# Patient Record
Sex: FEMALE | Race: WHITE | URBAN - METROPOLITAN AREA
[De-identification: names, ages, dates, MRNs, and addresses within clinical notes are randomized per-mention and may not be internally consistent; named-entity substitution may affect disease eponyms.]

---

## 2017-01-27 ENCOUNTER — HOSPITAL ENCOUNTER (EMERGENCY)
Facility: CLINIC | Age: 47
Discharge: HOME | End: 2017-01-27
Attending: PHYSICIAN ASSISTANT | Admitting: PHYSICIAN ASSISTANT
Payer: MEDICAID

## 2017-01-27 ENCOUNTER — APPOINTMENT (OUTPATIENT)
Dept: GENERAL RADIOLOGY | Facility: CLINIC | Age: 47
End: 2017-01-27
Attending: PHYSICIAN ASSISTANT
Payer: MEDICAID

## 2017-01-27 VITALS
SYSTOLIC BLOOD PRESSURE: 108 MMHG | WEIGHT: 140 LBS | TEMPERATURE: 98.5 F | OXYGEN SATURATION: 100 % | HEART RATE: 71 BPM | DIASTOLIC BLOOD PRESSURE: 76 MMHG | BODY MASS INDEX: 21.29 KG/M2 | RESPIRATION RATE: 16 BRPM

## 2017-01-27 DIAGNOSIS — R42 LIGHTHEADEDNESS: ICD-10-CM

## 2017-01-27 LAB
ANION GAP SERPL CALCULATED.3IONS-SCNC: 10 MMOL/L (ref 3–14)
BASOPHILS # BLD AUTO: 0 10E9/L (ref 0–0.2)
BASOPHILS NFR BLD AUTO: 0.6 %
BUN SERPL-MCNC: 11 MG/DL (ref 7–30)
CALCIUM SERPL-MCNC: 8.3 MG/DL (ref 8.5–10.1)
CHLORIDE SERPL-SCNC: 103 MMOL/L (ref 94–109)
CO2 SERPL-SCNC: 25 MMOL/L (ref 20–32)
CREAT SERPL-MCNC: 0.69 MG/DL (ref 0.52–1.04)
DIFFERENTIAL METHOD BLD: ABNORMAL
EOSINOPHIL # BLD AUTO: 0.2 10E9/L (ref 0–0.7)
EOSINOPHIL NFR BLD AUTO: 2.4 %
ERYTHROCYTE [DISTWIDTH] IN BLOOD BY AUTOMATED COUNT: 14.6 % (ref 10–15)
GFR SERPL CREATININE-BSD FRML MDRD: ABNORMAL ML/MIN/1.7M2
GLUCOSE SERPL-MCNC: 86 MG/DL (ref 70–99)
HCG SERPL QL: NEGATIVE
HCT VFR BLD AUTO: 32.9 % (ref 35–47)
HGB BLD-MCNC: 10.6 G/DL (ref 11.7–15.7)
IMM GRANULOCYTES # BLD: 0 10E9/L (ref 0–0.4)
IMM GRANULOCYTES NFR BLD: 0.2 %
INTERPRETATION ECG - MUSE: NORMAL
LYMPHOCYTES # BLD AUTO: 2.2 10E9/L (ref 0.8–5.3)
LYMPHOCYTES NFR BLD AUTO: 33.9 %
MCH RBC QN AUTO: 25.9 PG (ref 26.5–33)
MCHC RBC AUTO-ENTMCNC: 32.2 G/DL (ref 31.5–36.5)
MCV RBC AUTO: 80 FL (ref 78–100)
MONOCYTES # BLD AUTO: 0.5 10E9/L (ref 0–1.3)
MONOCYTES NFR BLD AUTO: 7.9 %
NEUTROPHILS # BLD AUTO: 3.6 10E9/L (ref 1.6–8.3)
NEUTROPHILS NFR BLD AUTO: 55 %
NRBC # BLD AUTO: 0 10*3/UL
NRBC BLD AUTO-RTO: 0 /100
PLATELET # BLD AUTO: 271 10E9/L (ref 150–450)
POTASSIUM SERPL-SCNC: 3.4 MMOL/L (ref 3.4–5.3)
RBC # BLD AUTO: 4.1 10E12/L (ref 3.8–5.2)
SODIUM SERPL-SCNC: 138 MMOL/L (ref 133–144)
TROPONIN I SERPL-MCNC: NORMAL UG/L (ref 0–0.04)
WBC # BLD AUTO: 6.6 10E9/L (ref 4–11)

## 2017-01-27 PROCEDURE — 84484 ASSAY OF TROPONIN QUANT: CPT | Performed by: PHYSICIAN ASSISTANT

## 2017-01-27 PROCEDURE — 99285 EMERGENCY DEPT VISIT HI MDM: CPT | Mod: 25

## 2017-01-27 PROCEDURE — 80048 BASIC METABOLIC PNL TOTAL CA: CPT | Performed by: PHYSICIAN ASSISTANT

## 2017-01-27 PROCEDURE — 93005 ELECTROCARDIOGRAM TRACING: CPT

## 2017-01-27 PROCEDURE — 85025 COMPLETE CBC W/AUTO DIFF WBC: CPT | Performed by: PHYSICIAN ASSISTANT

## 2017-01-27 PROCEDURE — 71020 XR CHEST 2 VW: CPT

## 2017-01-27 PROCEDURE — 84703 CHORIONIC GONADOTROPIN ASSAY: CPT | Performed by: PHYSICIAN ASSISTANT

## 2017-01-27 RX ORDER — SODIUM CHLORIDE 9 MG/ML
1000 INJECTION, SOLUTION INTRAVENOUS CONTINUOUS
Status: DISCONTINUED | OUTPATIENT
Start: 2017-01-27 | End: 2017-01-27 | Stop reason: HOSPADM

## 2017-01-27 RX ORDER — LIDOCAINE 40 MG/G
CREAM TOPICAL
Status: DISCONTINUED | OUTPATIENT
Start: 2017-01-27 | End: 2017-01-27 | Stop reason: HOSPADM

## 2017-01-27 ASSESSMENT — ENCOUNTER SYMPTOMS
SHORTNESS OF BREATH: 0
DIZZINESS: 0
APPETITE CHANGE: 0
VOMITING: 0
DIARRHEA: 0
CHILLS: 0
WEAKNESS: 0
FEVER: 0
COUGH: 1
ABDOMINAL PAIN: 0
NUMBNESS: 0
ACTIVITY CHANGE: 1
LIGHT-HEADEDNESS: 1
NAUSEA: 0

## 2017-01-27 NOTE — ED AVS SNAPSHOT
Emergency Department    [address] [place] [address]    Phone:  [phone]    Fax:  [phone]                                       Liza C Schwab   MRN: [id number]    Department:   Emergency Department   Date of Visit:  1/27/2017           After Visit Summary Signature Page     I have received my discharge instructions, and my questions have been answered. I have discussed any challenges I see with this plan with the nurse or doctor.    ..........................................................................................................................................  Patient/Patient Representative Signature      ..........................................................................................................................................  Patient Representative Print Name and Relationship to Patient    ..................................................               ................................................  Date                                            Time    ..........................................................................................................................................  Reviewed by Signature/Title    ...................................................              ..............................................  Date                                                            Time

## 2017-01-27 NOTE — DISCHARGE INSTRUCTIONS
"Drink plenty of fluids and get plenty of rest.    Follow-up with primary care Monday or Tuesday for a recheck.     Return to the ED with worsening lightheadedness or if you faint, worsening cough or chest discomfort, difficulty breathing or if you become worse in any way.         Near-Fainting: Uncertain Cause  Fainting (syncope) is a temporary loss of consciousness (\"passing out\"). This happens when blood flow to the brain is reduced. Near-fainting (\"near-syncope\") is like fainting, but you do not fully \"pass out.\" Instead, you feel like you are going to pass out, but do not actually lose consciousness.  Signs and symptoms  The following are symptoms of near-fainting:    Feeling lightheaded or like you are going to faint    Weak pulse    Nausea    Sweating    Blurred vision    Palpitations    Chest pain    Hard time breathing    Feeling cool and clammy  Causes  This happens when your blood pressure suddenly drops, and not enough blood flows to your brain.  Common minor causes include:    Sudden emotional stress such as fear, pain, panic, sight of blood    Straining or overexertion, such as straining while using the toilet, coughing, sneezing    Standing up too quickly, or standing up for too long a time    Pregnancy  More serious causes include:    Very slow, fast, or irregular heart rate, an arrhythmia    Dehydration    Blood loss    Medications, especially blood pressure or heart medicines, or your medicines have recently changed    Heart attack    Heart valve problems  Remember, even  minor  causes can become serious if you fall and injure yourself, or are driving. The exact cause of your episode is not certain. More tests may be needed. It is very important that you follow up with your doctor as advised.  Home care  The following guidelines will help you care for yourself at home:  1. Rest today. Resume your normal activities as soon as you are feeling back to normal.  2. If you become lightheaded or dizzy, lie " down right away or sit with your head between your knees.  3. Because we do not know the exact cause of your near fainting spell, another spell could occur without warning. Therefore, do not drive a car or use dangerous equipment. Do not take a bath alone (use a shower instead). Do not swim alone. You can resume these activities when your doctor says that you are no longer in danger of having a near fainting spell.  4. Stay well hydrated by drinking enough fluid each day.  Follow-up care  Follow up with your doctor as instructed.  Call 911  Call 911 if any of the following occur:    Another fainting spell occurs, and it is not explained by the common causes listed above    Chest, arm, neck, jaw, back or abdominal pain    Shortness of breath    Weakness, tingling, or numbness in one side of the face, one arm or leg    Slurred speech, confusion, trouble walking or seeing    Seizure    Blood in vomit or stools (black or red color)  When to seek medical care  Get prompt medical attention if any of the following occur:    Recent changes in your medications    Occasional mild lightheadedness, especially when standing up too quickly or straining    (In women) unexpected vaginal bleeding    7814-8817 [employer]. [address] [place] [address], [place] [address]. All rights reserved. This information is not intended as a substitute for professional medical care. Always follow your healthcare professional's instructions.

## 2017-01-27 NOTE — ED PROVIDER NOTES
"  History     Chief Complaint:  Lightheadedness     HPI   Liza C Schwab is a 46 year old female with history of anemia who presents with lightheadedness. The patient states that she has been sick for a couple weeks with flu like symptoms with post nasal drip and coughing to the point that \"her lungs feel raw\" and that her cough is worse at night. The patient notes that she felt lightheaded at work this morning. The patient then felt cold and clammy and tingling in both arms while driving here and pulled her car over and had her  bring her to the ED. The patient notes that she also felt heart burn and notes that she might have been given regular coffee instead of decaf and the tingling in her arms and hands may be secondary to that. The patient denies weakness, but feels more tired upon presentation and is concerned about her lightheadedness and the tingling in her hands that is radiating up her arms. The patient states that her hands are freezing and feels like they are about to go numb. The patient also reports that she had temporal pressure earlier today more prominent on the right than her left, as if she were going to get a headache, but this resolved and she never did develop head pain. The patient denies fevers, chills, dizziness, nausea, vomiting, diarrhea, decrease in appetite, visual disturbance, leg swelling, recent prolonged travel, pregnancy, abdominal pain or any other complications or concerns. The patient reports that she took one dose of Adderall this morning and that her last menstrual cycle was on 1/4/17.    Allergies:  Erythromycin     Medications:    Amphetamine-Dextroamphetamine (ADDERALL PO)  DiphenhydrAMINE HCl (BENADRYL PO)  Fluticasone (FLONASE) 50 MCG/ACT nasal spray    Past Medical History:    ADD  High cholesterol    Past Surgical History:    History reviewed. No pertinent past surgical history.    Family History:    Paternal grandfather: .myocardial infarction    Social " History:  Marital Status:   Presents to the ED alone  [facility]: [facility]     Review of Systems   Constitutional: Positive for activity change (more tired). Negative for fever, chills and appetite change.   HENT: Positive for postnasal drip. Negative for congestion.    Eyes: Negative for visual disturbance.   Respiratory: Positive for cough. Negative for shortness of breath.    Cardiovascular: Negative for chest pain and leg swelling.   Gastrointestinal: Negative for nausea, vomiting, abdominal pain and diarrhea.   Neurological: Positive for light-headedness. Negative for dizziness, syncope, weakness and numbness.        Positive for bilateral arm tingling.    All other systems reviewed and are negative.      Physical Exam   First Vitals:  BP: 131/72 mmHg  Pulse: 71  Heart Rate: 71  Temp: 98.5  F (36.9  C)  Resp: 16  Weight: 63.504 kg (140 lb)  SpO2: 100 %      Physical Exam  General: Resting comfortably on the gurney.    Head:  The scalp, head and face appear normal. No evidence of trauma.   ENT:  Pupils are equal, round and reactive to light. EOM intact. No nystagmus.    Oropharynx is moist.  No uvular deviation. Posterior oropharynx is without erythema, exudate or tonsillar swelling.   Neck:  Supple, no rigidity noted. Normal ROM.     Trachea midline. No mass detected.    Lymph: No anterior or posterior cervical lymphadenopathy noted.   Resp:  Non-labored breathing. No tachypnea.     Lung fields clear to auscultation without wheezes or rales.   CV:  Regular rate and rhythm. Normal S1 and S2, no S3 or S4.     No pathological murmur detected.     Radial and PT pulses intact and symmetric.   GI:  Abdomen is soft and non-distended.     Non-tender to palpation in all four quadrants.    MS:  Normal muscular tone.     No asymmetrical lower leg swelling or calf tenderness.   Neuro: GCS 15.     Awake and alert. Speech is clear.     Sensation intact to light touch upper and lower extremities.      Symmetric facial expressions, normal coordination.   Skin:  No rash or pallor.  Psych:  Normal affect. Appropriate interactions.       Emergency Department Course   ECG:  @ 1123  Indication: lightheadedness  Vent. Rate 65 bpm. WY interval 142 ms. QRS duration 84 ms. QT/QTc 424/440 ms. P-R-T axis 60 75 70.   Normal sinus rhythm. Normal ECG.  No significant change when compared to previous ECG from 9/6/15   Read @ 1126 by Catalina Jensen PA-C.      Imaging:  Radiographic findings were communicated with the patient who voiced understanding of the findings.      XR Chest 2 Views   Final Result   IMPRESSION:  Negative.       KASSY TRIMBLE MD          All Readings Per Radiology Unless Otherwise Noted.    Laboratory:  CBC: WBC 6.6, HGB 10.6 (L),   BMP: Ca 8.3 (L), Rest WNL (Creatinine 0.69)  (1115) Troponin I: <0.015  HCG Qualitative Pregnancy (1115): Negative.    ED Course:  Nursing notes and past medical history reviewed.   I performed a physical examination of the patient as documented above.  I explained the plan with the patient who consents to this.   EKG was done, interpretation as above.  The patient was sent for a chest x-ray while in the emergency department, findings above.   Blood was drawn from the patient. This was sent for laboratory testing, findings above.   (1241) Rechecked the patient.   Findings and plan explained to the patient. Patient discharged home with instructions regarding supportive care, medications, and reasons to return. The importance of close follow-up was reviewed.     Impression & Plan    Medical Decision Making:  Liza C Schwab is a 46 year old female with history of high cholesterol and ADD who presents ED with lightheadedness. She has also had a cough for several weeks. Concern was for cardiac event or arrhythmia, PE, pneumonia, anemia, among others. She is low risk by Well's Criteria and PERC negative, so I do not feel that she requires d-dimer or chest CT at this time as  PE is unlikely. Her ECG is normal with no signs of acute ischemic event or arrhythmia. A troponin is negative making, acute cardiac event less likely. She is also not having any significant chest pain, just a tightness when she coughs. She is not pregnant. Lab work is otherwise unremarkable with no signs of a leukocytosis, significant electrolyte abnormality, or renal dysfunction. She does have a slightly low hemoglobin at 10.6. However, she has a history of this chronically and she reports that 10.6 is actually quite good for her. Chest x-ray is negative with no signs of infiltrate, cardiomegaly, widened mediastinum, or any other acute abnormalities. No real chest or back pain, no widened mediastinum, and symmetric normal pulses, I doubt dissection in this patient. At this time it is not clear the exact cause of her light headedness. However, it has improved while here in the ED and her tingling has completely resolved. She reports that she has had this tingling in the past and had it worked up at one point and no distinct etiology was found. She reports that she thinks that she thinks she may have accidentally drank caffeine today for the first time in 2 years. We discussed that this definitely could be causing her symptoms of lightheadedness and to stay away from further caffeine at this time. This also might be due to her recovering from her recent illness. No signs of pneumonia on CXR and I suspect this is viral in nature as she has been gradually improving. Otherwise, the remainder of her exam is normal here and I do not feel that she requires further work up in the ED. Her vital signs are normal and at this time I feel that she is safe for discharge to home. She is to follow up with primary care in 3-4 days if not improving. She will return to the ED with worsening lightheadedness, syncope, chest pain or difficulty breathing, vomiting, fevers, or if she becomes worse in any way.    Diagnosis:    ICD-10-CM     1. Lightheadedness R42        Disposition:   Discharge to home.      I, Jo Ann Servin, am serving as a scribe on 1/27/2017 at 11:01 AM to personally document services performed by Catalina Jensen PA-C, based on my observations and the provider's statements to me.      Catalina Jensen PA-C  01/27/17 1700

## 2017-01-27 NOTE — ED NOTES
Bed: ED20  Expected date: 1/27/17  Expected time: 10:53 AM  Means of arrival:   Comments:  Triage

## 2017-01-27 NOTE — ED AVS SNAPSHOT
"  Emergency Department    [address] [facility] [address]    Phone:  [phone]    Fax:  [phone]                                       Liza C Schwab   MRN: [id number]    Department:   Emergency Department   Date of Visit:  1/27/2017           Patient Information     Date Of Birth          1970        Your diagnoses for this visit were:     Lightheadedness        You were seen by Catalina Jensen PA-C.      Follow-up Information     Follow up with [facility] In 3 days.    Contact information:    [address]  [address]  [facility] [address] [phone]          Follow up with  Emergency Department.    Specialty:  EMERGENCY MEDICINE    Why:  If symptoms worsen    Contact information:    [address] [facility] [address] [phone]        Discharge Instructions       Drink plenty of fluids and get plenty of rest.    Follow-up with primary care Monday or Tuesday for a recheck.     Return to the ED with worsening lightheadedness or if you faint, worsening cough or chest discomfort, difficulty breathing or if you become worse in any way.         Near-Fainting: Uncertain Cause  Fainting (syncope) is a temporary loss of consciousness (\"passing out\"). This happens when blood flow to the brain is reduced. Near-fainting (\"near-syncope\") is like fainting, but you do not fully \"pass out.\" Instead, you feel like you are going to pass out, but do not actually lose consciousness.  Signs and symptoms  The following are symptoms of near-fainting:    Feeling lightheaded or like you are going to faint    Weak pulse    Nausea    Sweating    Blurred vision    Palpitations    Chest pain    Hard time breathing    Feeling cool and clammy  Causes  This happens when your blood pressure suddenly drops, and not enough blood flows to your brain.  Common minor causes include:    Sudden emotional stress such as fear, pain, panic, sight of blood    Straining or " overexertion, such as straining while using the toilet, coughing, sneezing    Standing up too quickly, or standing up for too long a time    Pregnancy  More serious causes include:    Very slow, fast, or irregular heart rate, an arrhythmia    Dehydration    Blood loss    Medications, especially blood pressure or heart medicines, or your medicines have recently changed    Heart attack    Heart valve problems  Remember, even  minor  causes can become serious if you fall and injure yourself, or are driving. The exact cause of your episode is not certain. More tests may be needed. It is very important that you follow up with your doctor as advised.  Home care  The following guidelines will help you care for yourself at home:  1. Rest today. Resume your normal activities as soon as you are feeling back to normal.  2. If you become lightheaded or dizzy, lie down right away or sit with your head between your knees.  3. Because we do not know the exact cause of your near fainting spell, another spell could occur without warning. Therefore, do not drive a car or use dangerous equipment. Do not take a bath alone (use a shower instead). Do not swim alone. You can resume these activities when your doctor says that you are no longer in danger of having a near fainting spell.  4. Stay well hydrated by drinking enough fluid each day.  Follow-up care  Follow up with your doctor as instructed.  Call 911  Call 911 if any of the following occur:    Another fainting spell occurs, and it is not explained by the common causes listed above    Chest, arm, neck, jaw, back or abdominal pain    Shortness of breath    Weakness, tingling, or numbness in one side of the face, one arm or leg    Slurred speech, confusion, trouble walking or seeing    Seizure    Blood in vomit or stools (black or red color)  When to seek medical care  Get prompt medical attention if any of the following occur:    Recent changes in your medications    Occasional  mild lightheadedness, especially when standing up too quickly or straining    (In women) unexpected vaginal bleeding    1857-1214 [employer]. [address] [place] [address], [place] [address]. All rights reserved. This information is not intended as a substitute for professional medical care. Always follow your healthcare professional's instructions.                24 Hour Appointment Hotline       To make an appointment at any [facility], call [phone] ([phone]). If you don't have a family doctor or clinic, we will help you find one. [facility] are conveniently located to serve the needs of you and your family.             Review of your medicines      Our records show that you are taking the medicines listed below. If these are incorrect, please call your family doctor or clinic.        Dose / Directions Last dose taken    ADDERALL PO        Take by mouth 2 times daily   Refills:  0        BENADRYL PO   Dose:  25 mg        Take 25 mg by mouth daily as needed   Refills:  0        fluticasone 50 MCG/ACT spray   Commonly known as:  FLONASE   Dose:  2 spray        Spray 2 sprays into both nostrils daily   Refills:  0                Procedures and tests performed during your visit     Basic metabolic panel    CBC with platelets differential    EKG 12-lead, tracing only    HCG qualitative    Troponin I    XR Chest 2 Views      Orders Needing Specimen Collection     None      Pending Results     No orders found from 1/26/2017 to 1/28/2017.            Pending Culture Results     No orders found from 1/26/2017 to 1/28/2017.       Test Results from your hospital stay           1/27/2017 11:34 AM - Interface, [employer] Results      Component Results     Component Value Ref Range & Units Status    WBC 6.6 4.0 - 11.0 10e9/L Final    RBC Count 4.10 3.8 - 5.2 10e12/L Final    Hemoglobin 10.6 (L) 11.7 - 15.7 g/dL Final    Hematocrit 32.9 (L) 35.0 - 47.0 % Final    MCV 80 78 - 100 fl Final    MCH 25.9  (L) 26.5 - 33.0 pg Final    MCHC 32.2 31.5 - 36.5 g/dL Final    RDW 14.6 10.0 - 15.0 % Final    Platelet Count 271 150 - 450 10e9/L Final    Diff Method Automated Method  Final    % Neutrophils 55.0 % Final    % Lymphocytes 33.9 % Final    % Monocytes 7.9 % Final    % Eosinophils 2.4 % Final    % Basophils 0.6 % Final    % Immature Granulocytes 0.2 % Final    Nucleated RBCs 0 0 /100 Final    Absolute Neutrophil 3.6 1.6 - 8.3 10e9/L Final    Absolute Lymphocytes 2.2 0.8 - 5.3 10e9/L Final    Absolute Monocytes 0.5 0.0 - 1.3 10e9/L Final    Absolute Eosinophils 0.2 0.0 - 0.7 10e9/L Final    Absolute Basophils 0.0 0.0 - 0.2 10e9/L Final    Abs Immature Granulocytes 0.0 0 - 0.4 10e9/L Final    Absolute Nucleated RBC 0.0  Final         1/27/2017 11:54 AM - Interface, [employer] Results      Component Results     Component Value Ref Range & Units Status    Sodium 138 133 - 144 mmol/L Final    Potassium 3.4 3.4 - 5.3 mmol/L Final    Chloride 103 94 - 109 mmol/L Final    Carbon Dioxide 25 20 - 32 mmol/L Final    Anion Gap 10 3 - 14 mmol/L Final    Glucose 86 70 - 99 mg/dL Final    Urea Nitrogen 11 7 - 30 mg/dL Final    Creatinine 0.69 0.52 - 1.04 mg/dL Final    GFR Estimate >90  Non  GFR Calc   >60 mL/min/1.7m2 Final    GFR Estimate If Black >90   GFR Calc   >60 mL/min/1.7m2 Final    Calcium 8.3 (L) 8.5 - 10.1 mg/dL Final         1/27/2017 11:57 AM - Interface, [employer] Results      Component Results     Component Value Ref Range & Units Status    Troponin I ES  0.000 - 0.045 ug/L Final    <0.015  The 99th percentile for upper reference range is 0.045 ug/L.  Troponin values in   the range of 0.045 - 0.120 ug/L may be associated with risks of adverse   clinical events.           1/27/2017 12:35 PM - Interface, [employer] [facility]      Narrative     XR CHEST 2 VW  1/27/2017 11:43 AM    HISTORY:  Chest pain    COMPARISON:  10/28/2009        Impression     IMPRESSION:  Negative.     KASSY TRIMBLE MD          1/27/2017 12:21 PM - Interface, [employer] Results      Component Results     Component Value Ref Range & Units Status    HCG Qualitative Serum Negative NEG Final                Clinical Quality Measure: Blood Pressure Screening     Your blood pressure was checked while you were in the emergency department today. The last reading we obtained was  BP: 131/72 mmHg . Please read the guidelines below about what these numbers mean and what you should do about them.  If your systolic blood pressure (the top number) is less than 120 and your diastolic blood pressure (the bottom number) is less than 80, then your blood pressure is normal. There is nothing more that you need to do about it.  If your systolic blood pressure (the top number) is 120-139 or your diastolic blood pressure (the bottom number) is 80-89, your blood pressure may be higher than it should be. You should have your blood pressure rechecked within a year by a primary care provider.  If your systolic blood pressure (the top number) is 140 or greater or your diastolic blood pressure (the bottom number) is 90 or greater, you may have high blood pressure. High blood pressure is treatable, but if left untreated over time it can put you at risk for heart attack, stroke, or kidney failure. You should have your blood pressure rechecked by a primary care provider within the next 4 weeks.  If your provider in the emergency department today gave you specific instructions to follow-up with your doctor or provider even sooner than that, you should follow that instruction and not wait for up to 4 weeks for your follow-up visit.        Thank you for choosing [facility]       Thank you for choosing [facility] for your care. Our goal is always to provide you with excellent care. Hearing back from our patients is one way we can continue to improve our services. Please take a few minutes to complete the written survey that you may receive in the mail after you visit with us.  "Thank you!        [employer] Information     Eligible lets you send messages to your doctor, view your test results, renew your prescriptions, schedule appointments and more. To sign up, go to [email or web address] . Click on \"Log in\" on the left side of the screen, which will take you to the Welcome page. Then click on \"Sign up Now\" on the right side of the page.     You will be asked to enter the access code listed below, as well as some personal information. Please follow the directions to create your username and password.     Your access code is: [email or web address]  Expires: 2017 12:51 PM     Your access code will  in 90 days. If you need help or a new code, please call your [place] clinic or [phone].        Care EveryWhere ID     This is your Care EveryWhere ID. This could be used by other organizations to access your [place] medical records  [id number]        After Visit Summary       This is your record. Keep this with you and show to your community pharmacist(s) and doctor(s) at your next visit.                  "